# Patient Record
Sex: MALE | Race: WHITE | NOT HISPANIC OR LATINO | URBAN - METROPOLITAN AREA
[De-identification: names, ages, dates, MRNs, and addresses within clinical notes are randomized per-mention and may not be internally consistent; named-entity substitution may affect disease eponyms.]

---

## 2021-12-14 ENCOUNTER — TELEPHONE ENCOUNTER (OUTPATIENT)
Dept: URBAN - METROPOLITAN AREA CLINIC 113 | Facility: CLINIC | Age: 66
End: 2021-12-14

## 2022-02-15 ENCOUNTER — LAB OUTSIDE AN ENCOUNTER (OUTPATIENT)
Dept: URBAN - METROPOLITAN AREA CLINIC 72 | Facility: CLINIC | Age: 67
End: 2022-02-15

## 2022-02-15 ENCOUNTER — OFFICE VISIT (OUTPATIENT)
Dept: URBAN - METROPOLITAN AREA CLINIC 72 | Facility: CLINIC | Age: 67
End: 2022-02-15
Payer: MEDICARE

## 2022-02-15 ENCOUNTER — WEB ENCOUNTER (OUTPATIENT)
Dept: URBAN - METROPOLITAN AREA CLINIC 72 | Facility: CLINIC | Age: 67
End: 2022-02-15

## 2022-02-15 VITALS
BODY MASS INDEX: 26.88 KG/M2 | SYSTOLIC BLOOD PRESSURE: 136 MMHG | TEMPERATURE: 98.1 F | HEIGHT: 71 IN | DIASTOLIC BLOOD PRESSURE: 86 MMHG | WEIGHT: 192 LBS | RESPIRATION RATE: 18 BRPM | HEART RATE: 72 BPM

## 2022-02-15 DIAGNOSIS — Z86.010 HISTORY OF COLON POLYPS: ICD-10-CM

## 2022-02-15 DIAGNOSIS — K51.90 ULCERATIVE COLITIS WITHOUT COMPLICATIONS, UNSPECIFIED LOCATION: ICD-10-CM

## 2022-02-15 PROBLEM — 64766004: Status: ACTIVE | Noted: 2022-02-15

## 2022-02-15 PROCEDURE — 99204 OFFICE O/P NEW MOD 45 MIN: CPT | Performed by: INTERNAL MEDICINE

## 2022-02-15 RX ORDER — BALSALAZIDE DISODIUM 750 MG/1
2 CAPSULES CAPSULE ORAL TWICE A DAY
Qty: 360 | Refills: 0
End: 2022-05-16

## 2022-02-15 RX ORDER — ALLOPURINOL 100 MG/1
1 TABLET TABLET ORAL ONCE A DAY
Status: ACTIVE | COMMUNITY

## 2022-02-15 RX ORDER — BALSALAZIDE DISODIUM 750 MG/1
2 CAPSULES CAPSULE ORAL TWICE A DAY
Status: ACTIVE | COMMUNITY

## 2022-02-15 NOTE — HPI-TODAY'S VISIT:
Mr. Weber is a pleasant 66 year old male who presents as a new patient for consultation for colonoscopy surveillance for history of colon polyps last colonoscopy done in 2016, he was referred by Dr. Leigha Bassett.   He reports a history of ulcerative colitis  He reports that he has had ulcerative colitis since the 80s, initially placed on sulfasalazine ultimately transitioned to balsalazide.  He believes he has left-sided disease.  He has been getting colonoscopies every 5 years.  He has not had a flare in quite some time, denies any blood no recent issues.  His weight has been stable.  He overall feels well and would like to arrange colonoscopy.

## 2022-04-29 ENCOUNTER — OFFICE VISIT (OUTPATIENT)
Dept: URBAN - METROPOLITAN AREA MEDICAL CENTER 40 | Facility: MEDICAL CENTER | Age: 67
End: 2022-04-29

## 2022-05-18 ENCOUNTER — OFFICE VISIT (OUTPATIENT)
Dept: URBAN - METROPOLITAN AREA CLINIC 72 | Facility: CLINIC | Age: 67
End: 2022-05-18

## 2022-05-24 ENCOUNTER — TELEPHONE ENCOUNTER (OUTPATIENT)
Dept: URBAN - METROPOLITAN AREA CLINIC 72 | Facility: CLINIC | Age: 67
End: 2022-05-24

## 2022-05-24 PROBLEM — 428283002 HISTORY OF POLYP OF COLON: Status: ACTIVE | Noted: 2022-02-15

## 2022-05-24 RX ORDER — BALSALAZIDE DISODIUM 750 MG/1
2 CAPSULES CAPSULE ORAL TWICE A DAY
Qty: 360 CAPSULE | OUTPATIENT
Start: 2022-05-24

## 2022-06-09 ENCOUNTER — OFFICE VISIT (OUTPATIENT)
Dept: URBAN - METROPOLITAN AREA MEDICAL CENTER 40 | Facility: MEDICAL CENTER | Age: 67
End: 2022-06-09
Payer: MEDICARE

## 2022-06-09 DIAGNOSIS — K63.5 BENIGN COLON POLYP: ICD-10-CM

## 2022-06-09 DIAGNOSIS — K51.80 ULCERATIVE COLITIS: ICD-10-CM

## 2022-06-09 PROCEDURE — 45380 COLONOSCOPY AND BIOPSY: CPT | Performed by: INTERNAL MEDICINE

## 2022-06-22 PROBLEM — 64766004 ULCERATIVE COLITIS: Status: ACTIVE | Noted: 2022-06-22

## 2022-08-29 ENCOUNTER — TELEPHONE ENCOUNTER (OUTPATIENT)
Dept: URBAN - METROPOLITAN AREA CLINIC 72 | Facility: CLINIC | Age: 67
End: 2022-08-29

## 2022-08-29 RX ORDER — BALSALAZIDE DISODIUM 750 MG/1
2 CAPSULES CAPSULE ORAL TWICE A DAY
Qty: 360 CAPSULE | Refills: 3
Start: 2022-05-24 | End: 2023-08-27

## 2023-10-04 ENCOUNTER — TELEPHONE ENCOUNTER (OUTPATIENT)
Dept: URBAN - METROPOLITAN AREA CLINIC 72 | Facility: CLINIC | Age: 68
End: 2023-10-04

## 2023-10-04 RX ORDER — BALSALAZIDE DISODIUM 750 MG/1
2 CAPSULES CAPSULE ORAL TWICE A DAY
Qty: 360 CAPSULE | Refills: 0
Start: 2022-05-24 | End: 2024-01-01

## 2023-10-06 ENCOUNTER — DASHBOARD ENCOUNTERS (OUTPATIENT)
Age: 68
End: 2023-10-06

## 2023-10-06 ENCOUNTER — OFFICE VISIT (OUTPATIENT)
Dept: URBAN - METROPOLITAN AREA CLINIC 72 | Facility: CLINIC | Age: 68
End: 2023-10-06
Payer: MEDICARE

## 2023-10-06 ENCOUNTER — WEB ENCOUNTER (OUTPATIENT)
Dept: URBAN - METROPOLITAN AREA CLINIC 72 | Facility: CLINIC | Age: 68
End: 2023-10-06

## 2023-10-06 VITALS
HEIGHT: 71 IN | WEIGHT: 179.4 LBS | HEART RATE: 62 BPM | DIASTOLIC BLOOD PRESSURE: 70 MMHG | SYSTOLIC BLOOD PRESSURE: 131 MMHG | BODY MASS INDEX: 25.11 KG/M2 | TEMPERATURE: 97.1 F

## 2023-10-06 DIAGNOSIS — Z86.010 HISTORY OF COLON POLYPS: ICD-10-CM

## 2023-10-06 DIAGNOSIS — K51.80 CHRONIC PANCOLONIC ULCERATIVE COLITIS: ICD-10-CM

## 2023-10-06 PROCEDURE — 99214 OFFICE O/P EST MOD 30 MIN: CPT | Performed by: NURSE PRACTITIONER

## 2023-10-06 RX ORDER — BALSALAZIDE DISODIUM 750 MG/1
2 CAPSULES CAPSULE ORAL TWICE A DAY
Qty: 360 | Refills: 3
Start: 2022-05-24 | End: 2024-09-30

## 2023-10-06 RX ORDER — ALLOPURINOL 100 MG/1
1 TABLET TABLET ORAL ONCE A DAY
Status: ACTIVE | COMMUNITY

## 2023-10-06 RX ORDER — BALSALAZIDE DISODIUM 750 MG/1
2 CAPSULES CAPSULE ORAL TWICE A DAY
Qty: 360 CAPSULE | Refills: 0 | Status: ACTIVE | COMMUNITY
Start: 2022-05-24 | End: 2024-01-01

## 2023-10-06 NOTE — HPI-TODAY'S VISIT:
Procedures: -Colonoscopy done in 2016 for history of colon polyps -Colonoscopy 6/9/2022 revealed pseudopolyps at 60 cm from the rectum to 10 cm from the rectum.  Multiple biopsies performed.  No evidence of active colitis.  All biopsies normal recommended repeat colonoscopy in 2 years.  Labs: -4/25/2023.  MORIS negative.  Normal vitamin B12 and folate.  CRP normal at 1.01.  Vitamin D level normal.  Normal TSH and free T4.  CBC normal with Hgb 14.5.  CMP glucose 106, creatinine 0.75.  Lipid panel normal

## 2023-10-06 NOTE — HPI-TODAY'S VISIT:
-- DO NOT REPLY / DO NOT REPLY ALL --  -- Message is from the [facility]--    General Patient Message      Reason for Call: Patient calling to check the status of med refill request. Patient has scheduled a follow up appt for Wed 9/15/2021. Patient is requesting that a rx is sent to pharmacy to last until appt date. Please call to discuss     Caller Information       Type Contact Phone    09/11/2021 10:19 AM CDT Phone (Incoming) Goldy Huizar (Self) [phone] (M)    09/13/2021 06:05 PM CDT Phone (Incoming) Goldy Huizar (Self) [phone] (M)          Alternative phone number: n/a        Did the caller agree that this message can wait until the office reopens in the morning? YES - The Message Can Wait      Send a message to the provider’s clinical support pool.     Turnaround time given to caller:   \"This message will be sent to [state Provider's name]. The clinical team will fulfill your request as soon as they review your message when the office opens tomorrow.\"         68-year-old male here for medication refill.  He reports a history of ulcerative colitis since the 80s, initially placed on sulfasalazine ultimately transitioned to balsalazide.  He believes he has left-sided disease.  He has been getting colonoscopies every 5 years.  He has not had a flare in quite some time, denies any blood no recent issues.  His weight has been stable.  He overall feels well and would like to arrange colonoscopy.  Called recently requesting refill of her balsalazide 750 mg capsules 2 capsules twice a day.  Last seen 2/15/2022 for history of ulcerative colitis and colon polyps.  Colonoscopy ordered for further evaluation his balsalazide was refilled.  Today he reports 1 BM a day.  No melena, hematochezia, mucou, melena or abdominal pain.  His weight is down 13 pounds from previous.   Rare NSAID use.

## 2024-01-19 ENCOUNTER — TELEPHONE ENCOUNTER (OUTPATIENT)
Dept: URBAN - METROPOLITAN AREA CLINIC 72 | Facility: CLINIC | Age: 69
End: 2024-01-19

## 2024-01-19 RX ORDER — BALSALAZIDE DISODIUM 750 MG/1
2 CAPSULES CAPSULE ORAL TWICE A DAY
Qty: 360 | Refills: 3
Start: 2022-05-24 | End: 2025-01-13

## 2024-06-10 ENCOUNTER — LAB OUTSIDE AN ENCOUNTER (OUTPATIENT)
Dept: URBAN - METROPOLITAN AREA CLINIC 72 | Facility: CLINIC | Age: 69
End: 2024-06-10

## 2024-06-14 ENCOUNTER — OFFICE VISIT (OUTPATIENT)
Dept: URBAN - METROPOLITAN AREA MEDICAL CENTER 40 | Facility: MEDICAL CENTER | Age: 69
End: 2024-06-14
Payer: MEDICARE

## 2024-06-14 DIAGNOSIS — D12.2 ADENOMA OF ASCENDING COLON: ICD-10-CM

## 2024-06-14 DIAGNOSIS — K51.80 CHRONIC PANCOLONIC ULCERATIVE COLITIS: ICD-10-CM

## 2024-06-14 PROCEDURE — 45385 COLONOSCOPY W/LESION REMOVAL: CPT | Performed by: INTERNAL MEDICINE

## 2024-06-14 PROCEDURE — 45380 COLONOSCOPY AND BIOPSY: CPT | Performed by: INTERNAL MEDICINE

## 2024-06-14 RX ORDER — ALLOPURINOL 100 MG/1
1 TABLET TABLET ORAL ONCE A DAY
Status: ACTIVE | COMMUNITY

## 2024-06-14 RX ORDER — BALSALAZIDE DISODIUM 750 MG/1
2 CAPSULES CAPSULE ORAL TWICE A DAY
Qty: 360 | Refills: 3 | Status: ACTIVE | COMMUNITY
Start: 2022-05-24 | End: 2025-01-13

## 2025-01-30 ENCOUNTER — ERX REFILL RESPONSE (OUTPATIENT)
Dept: URBAN - METROPOLITAN AREA CLINIC 72 | Facility: CLINIC | Age: 70
End: 2025-01-30

## 2025-01-30 RX ORDER — BALSALAZIDE DISODIUM 750 MG/1
TAKE TWO CAPSULES BY MOUTH TWICE A DAY CAPSULE ORAL
Qty: 360 CAPSULE | Refills: 0 | OUTPATIENT

## 2025-01-30 RX ORDER — BALSALAZIDE DISODIUM 750 MG/1
TAKE TWO CAPSULES BY MOUTH TWICE A DAY CAPSULE ORAL
Qty: 360 CAPSULE | Refills: 4 | OUTPATIENT

## 2025-03-06 ENCOUNTER — LAB OUTSIDE AN ENCOUNTER (OUTPATIENT)
Dept: URBAN - METROPOLITAN AREA CLINIC 72 | Facility: CLINIC | Age: 70
End: 2025-03-06

## 2025-03-06 ENCOUNTER — OFFICE VISIT (OUTPATIENT)
Dept: URBAN - METROPOLITAN AREA CLINIC 72 | Facility: CLINIC | Age: 70
End: 2025-03-06
Payer: MEDICARE

## 2025-03-06 VITALS
BODY MASS INDEX: 26.35 KG/M2 | WEIGHT: 188.2 LBS | SYSTOLIC BLOOD PRESSURE: 146 MMHG | DIASTOLIC BLOOD PRESSURE: 75 MMHG | TEMPERATURE: 96.8 F | HEART RATE: 68 BPM | HEIGHT: 71 IN

## 2025-03-06 DIAGNOSIS — K51.90 ULCERATIVE COLITIS WITHOUT COMPLICATIONS, UNSPECIFIED LOCATION: ICD-10-CM

## 2025-03-06 PROCEDURE — 99214 OFFICE O/P EST MOD 30 MIN: CPT

## 2025-03-06 RX ORDER — BALSALAZIDE DISODIUM 750 MG/1
TAKE TWO CAPSULES BY MOUTH TWICE A DAY CAPSULE ORAL
Qty: 360 CAPSULE | Refills: 0 | Status: ACTIVE | COMMUNITY

## 2025-03-06 RX ORDER — BALSALAZIDE DISODIUM 750 MG/1
2 CAPSULES CAPSULE ORAL TWICE A DAY
Qty: 360 | OUTPATIENT
Start: 2025-03-06 | End: 2025-06-04

## 2025-03-06 RX ORDER — ALLOPURINOL 100 MG/1
1/2 TABLET TABLET ORAL ONCE A DAY
Status: ACTIVE | COMMUNITY

## 2025-03-06 NOTE — HPI-TODAY'S VISIT:
Patient is a 69-year-old male here for medication refill.  Last time seen in the office was 10/6/2023.  Patient is on balsalazide 750 mg capsules 2 capsules twice daily for ulcerative colitis diagnosed in the 80s.  He was initially placed on sulfasalazine but ultimately transition to balsalazide.  He gets colonoscopies every 5 years with the last on 2/15/2022.  At that time he had no evidence of active colitis and was told to recall in 2 years.  That recall is 2/2024.  Patient is here and he is doing really well; however, it is now Tier 4 and he is paying close to $250 per 3 months. We discussed working up to switch medications to something more cost effective. BM's daily, no blood, no abd pain.

## 2025-03-06 NOTE — HPI-OTHER HISTORIES
Procedures:        -Colonoscopy done in 2016 for history of colon polyps        -Colonoscopy 6/9/2022 revealed pseudopolyps at 60 cm from the rectum to 10 cm from the rectum. Multiple biopsies performed. No evidence of active colitis. All biopsies normal recommended repeat colonoscopy in 2 years. Due 2/2024.   Labs:        -4/25/2023. MORIS negative. Normal vitamin B12 and folate. CRP normal at 1.01. Vitamin D level normal. Normal TSH and free T4. CBC normal with Hgb 14.5. CMP glucose 106, creatinine 0.75. Lipid panel normal.

## 2025-03-17 ENCOUNTER — TELEPHONE ENCOUNTER (OUTPATIENT)
Dept: URBAN - METROPOLITAN AREA CLINIC 72 | Facility: CLINIC | Age: 70
End: 2025-03-17

## 2025-05-29 ENCOUNTER — OFFICE VISIT (OUTPATIENT)
Dept: URBAN - METROPOLITAN AREA MEDICAL CENTER 40 | Facility: MEDICAL CENTER | Age: 70
End: 2025-05-29

## 2025-06-15 ENCOUNTER — ERX REFILL RESPONSE (OUTPATIENT)
Dept: URBAN - METROPOLITAN AREA CLINIC 72 | Facility: CLINIC | Age: 70
End: 2025-06-15

## 2025-06-15 RX ORDER — BALSALAZIDE DISODIUM 750 MG/1
TAKE TWO CAPSULES BY MOUTH TWICE A DAY CAPSULE ORAL
Qty: 360 CAPSULE | Refills: 3

## 2025-06-18 ENCOUNTER — OFFICE VISIT (OUTPATIENT)
Dept: URBAN - METROPOLITAN AREA CLINIC 72 | Facility: CLINIC | Age: 70
End: 2025-06-18